# Patient Record
Sex: MALE | Race: WHITE | NOT HISPANIC OR LATINO | Employment: FULL TIME | URBAN - METROPOLITAN AREA
[De-identification: names, ages, dates, MRNs, and addresses within clinical notes are randomized per-mention and may not be internally consistent; named-entity substitution may affect disease eponyms.]

---

## 2024-02-16 NOTE — PROGRESS NOTES
Initial Medical Weight Loss Appointment (MWL 1)     Starting Weight: 284 lb / BMI: 40.17    Diet Experience: Jam reports that he hasn't tried any specific diets for weight loss attempts in the past, however he has tried to watch what he ate and choose healthier food options. Jam reports that he struggles the most with his hunger - he reports he does not ever feel full/satisfied. Jam reports that he experiences daily heartburn for the past year. He reports that every single food/beverage gives him heartburn besides water. Jam reports that he does take daily medication to help symptoms.   Pt Seeking: RYGB  Pertinent Co-morbidities: sleep apnea x 2 years. Pt reports he does not use a CPAP.   Current Daily Intake:    Breakfast- eggs with hash browns and murphy or sausage   Lunch- skips. OR snacks   Dinner- pasta dish, OR a meat such as fish/a roast with a vegetable   How many times do you order takeout, fast food and/or go out to eat per week? Maybe 1x/week per pt   Snacks: slim wil, Little Birgit   Beverages: 1 gallon per day of homemade sweet tea (tea mixed with sugar), apple juice, minimal water  Alcohol Intake: about every 2 weeks, 1-2 drinks per occasion. Mostly Twisted Tea's (1 can provides 190 kcal, 24g suagr)  Food Allergies? None  Food Intolerances? Basically all foods/seasonings/sauces/beverages cause acid reflux  Food Dislikes? Asapargus   Do you eat when you are not hungry and/or when you feel full? No   Do you eat when you are bored/stressed/emotional? No   Current Exercise/Exercise Hx: active in daily routine. Exercise hx includes treadmill/weights. Pt reports he did not feel like the exercise worked.   Hours of sleep per night: 4. Pt notes he struggles with sleep due to untreated sleep apnea and acid reflux during the night.   Work schedule: M-F, 8 hours per day.   Who is in the household? Himself, his fiance and 3 children  Who does the cooking/grocery shopping? His fiance does both   What do you  want to get out of surgery? Pt wants to be more active and fit into smaller clothing size.  Motivation to change (1-10): 9    Estimated ability to achieve goals: good    Today's Lesson: Review of Dr. Lara's lifelong rules, calorie counting, food label reading, promoting feelings of satiety, and energy balance.  Diet Goals: Practice the lifelong rules  Exercise Recommendations: Gradually work up to 150 minutes of moderate intensity exercise per week.  Behavior Changes: will be assessed every month  Behavior Goals:  1. Consistently incorporate a lunch meal.  2. Reduce intake of sweet tea. Try making tea with sugar replacement.   3. Exercise: walk 2x/week around block.     Plan: Will follow up next month. Will continue to monitor pt's weight, dietary & behavior changes.        Irena Bacon RD, LDN  Registered Dietitian, Licensed Dietitian Nutritionist

## 2024-02-21 ENCOUNTER — OFFICE VISIT (OUTPATIENT)
Dept: SURGERY | Facility: CLINIC | Age: 29
End: 2024-02-21
Payer: COMMERCIAL

## 2024-02-21 ENCOUNTER — NUTRITION (OUTPATIENT)
Dept: SURGERY | Facility: CLINIC | Age: 29
End: 2024-02-21
Payer: COMMERCIAL

## 2024-02-21 VITALS
BODY MASS INDEX: 39.76 KG/M2 | DIASTOLIC BLOOD PRESSURE: 80 MMHG | HEIGHT: 71 IN | RESPIRATION RATE: 16 BRPM | SYSTOLIC BLOOD PRESSURE: 133 MMHG | WEIGHT: 284 LBS

## 2024-02-21 VITALS — BODY MASS INDEX: 39.61 KG/M2 | WEIGHT: 284 LBS

## 2024-02-21 DIAGNOSIS — E63.9 NUTRITIONAL DISORDER: ICD-10-CM

## 2024-02-21 DIAGNOSIS — E61.0 COPPER DEFICIENCY: ICD-10-CM

## 2024-02-21 DIAGNOSIS — E53.8 B12 DEFICIENCY: ICD-10-CM

## 2024-02-21 DIAGNOSIS — E51.9 THIAMINE DEFICIENCY: ICD-10-CM

## 2024-02-21 DIAGNOSIS — K21.9 GERD WITHOUT ESOPHAGITIS: Primary | ICD-10-CM

## 2024-02-21 DIAGNOSIS — R73.9 HYPERGLYCEMIA: ICD-10-CM

## 2024-02-21 DIAGNOSIS — D50.8 IRON DEFICIENCY ANEMIA SECONDARY TO INADEQUATE DIETARY IRON INTAKE: ICD-10-CM

## 2024-02-21 DIAGNOSIS — G47.33 OBSTRUCTIVE SLEEP APNEA: ICD-10-CM

## 2024-02-21 DIAGNOSIS — E55.9 VITAMIN D DEFICIENCY: ICD-10-CM

## 2024-02-21 DIAGNOSIS — E07.9 DISEASE OF THYROID GLAND: ICD-10-CM

## 2024-02-21 DIAGNOSIS — D68.9 COAGULATION DISORDER (MULTI): ICD-10-CM

## 2024-02-21 DIAGNOSIS — Z71.3 ENCOUNTER FOR NUTRITION EVALUATION PRIOR TO BARIATRIC SURGERY: ICD-10-CM

## 2024-02-21 PROCEDURE — 1036F TOBACCO NON-USER: CPT | Performed by: INTERNAL MEDICINE

## 2024-02-21 PROCEDURE — 99204 OFFICE O/P NEW MOD 45 MIN: CPT | Performed by: INTERNAL MEDICINE

## 2024-02-21 ASSESSMENT — ENCOUNTER SYMPTOMS
ROS GI COMMENTS: HEARTBURN
COUGH: 0
ABDOMINAL PAIN: 0
DEPRESSION: 0
PALPITATIONS: 0
OCCASIONAL FEELINGS OF UNSTEADINESS: 0
SHORTNESS OF BREATH: 0
LOSS OF SENSATION IN FEET: 0
ARTHRALGIAS: 0
DIZZINESS: 0
NAUSEA: 0
DIARRHEA: 0
CONSTIPATION: 0

## 2024-02-21 ASSESSMENT — PATIENT HEALTH QUESTIONNAIRE - PHQ9
SUM OF ALL RESPONSES TO PHQ9 QUESTIONS 1 AND 2: 0
2. FEELING DOWN, DEPRESSED OR HOPELESS: NOT AT ALL
1. LITTLE INTEREST OR PLEASURE IN DOING THINGS: NOT AT ALL

## 2024-02-21 ASSESSMENT — PAIN SCALES - GENERAL: PAINLEVEL: 0-NO PAIN

## 2024-02-21 NOTE — PROGRESS NOTES
"Subjective   Patient ID: Jam Ann is a 28 y.o. male who presents for [facility] visit 1. Patient has been trying to lose weight for many years by following different diets like Weight Watchers, lost some weight but did not maintain. Currently has 2 meals a day. Breakfast includes eggs, murphy, sausage. He snacks for lunch. For dinner, chicken, steak, or pasta. Snacks on chips, cookies. Drinks sweet tea, a little water. Regarding exercise, he walks at work. Plans to increase. Denies previous sleep study. C/o heartburn. Denies personal hx of blood clots. His father has hx of DVT.    Diagnostics Reviewed: 7/2023 EKG NSR  Labs Reviewed:         Review of Systems   Respiratory:  Negative for cough and shortness of breath.    Cardiovascular:  Negative for chest pain and palpitations.   Gastrointestinal:  Negative for abdominal pain, constipation, diarrhea and nausea.        Heartburn   Musculoskeletal:  Negative for arthralgias.   Neurological:  Negative for dizziness.       Objective   /80   Resp 16   Ht 1.791 m (5' 10.5\")   Wt 129 kg (284 lb)   BMI 40.17 kg/m²     Physical Exam  Constitutional:       General: He is not in acute distress.     Appearance: He is obese.   HENT:      Mouth/Throat:      Comments: Dentition WNL  Cardiovascular:      Rate and Rhythm: Normal rate and regular rhythm.      Heart sounds: Normal heart sounds.   Pulmonary:      Breath sounds: Normal breath sounds.   Abdominal:      Palpations: Abdomen is soft.      Tenderness: There is no abdominal tenderness.   Musculoskeletal:      Cervical back: No tenderness.      Right lower leg: No edema.      Left lower leg: No edema.   Lymphadenopathy:      Cervical: No cervical adenopathy.   Skin:     General: Skin is warm.      Findings: No erythema.   Neurological:      Mental Status: He is alert and oriented to person, place, and time.      Gait: Gait is intact.   Psychiatric:         Mood and Affect: Mood normal.         Behavior: Behavior normal. "         Assessment/Plan   Diagnoses and all orders for this visit:  Coagulation disorder (CMS/HCC)  -     Home sleep apnea test (HSAT); Future  -     CBC and Auto Differential; Future  -     aPTT; Future  -     Hemoglobin A1C; Future  -     Folate; Future  -     Ferritin; Future  -     Comprehensive Metabolic Panel; Future  -     TSH with reflex to Free T4 if abnormal; Future  -     Lipid Panel; Future  -     Protime-INR; Future  -     Vitamin D 25-Hydroxy,Total (for eval of Vitamin D levels); Future  -     Vitamin B12; Future  -     Parathyroid Hormone, Intact; Future  -     Vitamin B1, Whole Blood; Future  -     Copper, Blood; Future  -     Vitamin A; Future  -     Vitamin E; Future  -     Zinc, Serum or Plasma; Future  -     Iron and TIBC; Future  Copper deficiency  -     Home sleep apnea test (HSAT); Future  -     CBC and Auto Differential; Future  -     aPTT; Future  -     Hemoglobin A1C; Future  -     Folate; Future  -     Ferritin; Future  -     Comprehensive Metabolic Panel; Future  -     TSH with reflex to Free T4 if abnormal; Future  -     Lipid Panel; Future  -     Protime-INR; Future  -     Vitamin D 25-Hydroxy,Total (for eval of Vitamin D levels); Future  -     Vitamin B12; Future  -     Parathyroid Hormone, Intact; Future  -     Vitamin B1, Whole Blood; Future  -     Copper, Blood; Future  -     Vitamin A; Future  -     Vitamin E; Future  -     Zinc, Serum or Plasma; Future  -     Iron and TIBC; Future  Encounter for nutrition evaluation prior to bariatric surgery  -     Home sleep apnea test (HSAT); Future  -     CBC and Auto Differential; Future  -     aPTT; Future  -     Hemoglobin A1C; Future  -     Folate; Future  -     Ferritin; Future  -     Comprehensive Metabolic Panel; Future  -     TSH with reflex to Free T4 if abnormal; Future  -     Lipid Panel; Future  -     Protime-INR; Future  -     Vitamin D 25-Hydroxy,Total (for eval of Vitamin D levels); Future  -     Vitamin B12; Future  -      Parathyroid Hormone, Intact; Future  -     Vitamin B1, Whole Blood; Future  -     Copper, Blood; Future  -     Vitamin A; Future  -     Vitamin E; Future  -     Zinc, Serum or Plasma; Future  -     Iron and TIBC; Future  Hyperglycemia  -     Home sleep apnea test (HSAT); Future  -     CBC and Auto Differential; Future  -     aPTT; Future  -     Hemoglobin A1C; Future  -     Folate; Future  -     Ferritin; Future  -     Comprehensive Metabolic Panel; Future  -     TSH with reflex to Free T4 if abnormal; Future  -     Lipid Panel; Future  -     Protime-INR; Future  -     Vitamin D 25-Hydroxy,Total (for eval of Vitamin D levels); Future  -     Vitamin B12; Future  -     Parathyroid Hormone, Intact; Future  -     Vitamin B1, Whole Blood; Future  -     Copper, Blood; Future  -     Vitamin A; Future  -     Vitamin E; Future  -     Zinc, Serum or Plasma; Future  -     Iron and TIBC; Future  B12 deficiency  -     Home sleep apnea test (HSAT); Future  -     CBC and Auto Differential; Future  -     aPTT; Future  -     Hemoglobin A1C; Future  -     Folate; Future  -     Ferritin; Future  -     Comprehensive Metabolic Panel; Future  -     TSH with reflex to Free T4 if abnormal; Future  -     Lipid Panel; Future  -     Protime-INR; Future  -     Vitamin D 25-Hydroxy,Total (for eval of Vitamin D levels); Future  -     Vitamin B12; Future  -     Parathyroid Hormone, Intact; Future  -     Vitamin B1, Whole Blood; Future  -     Copper, Blood; Future  -     Vitamin A; Future  -     Vitamin E; Future  -     Zinc, Serum or Plasma; Future  -     Iron and TIBC; Future  Disease of thyroid gland  -     Home sleep apnea test (HSAT); Future  -     CBC and Auto Differential; Future  -     aPTT; Future  -     Hemoglobin A1C; Future  -     Folate; Future  -     Ferritin; Future  -     Comprehensive Metabolic Panel; Future  -     TSH with reflex to Free T4 if abnormal; Future  -     Lipid Panel; Future  -     Protime-INR; Future  -     Vitamin D  25-Hydroxy,Total (for eval of Vitamin D levels); Future  -     Vitamin B12; Future  -     Parathyroid Hormone, Intact; Future  -     Vitamin B1, Whole Blood; Future  -     Copper, Blood; Future  -     Vitamin A; Future  -     Vitamin E; Future  -     Zinc, Serum or Plasma; Future  -     Iron and TIBC; Future  Iron deficiency anemia secondary to inadequate dietary iron intake  -     Home sleep apnea test (HSAT); Future  -     CBC and Auto Differential; Future  -     aPTT; Future  -     Hemoglobin A1C; Future  -     Folate; Future  -     Ferritin; Future  -     Comprehensive Metabolic Panel; Future  -     TSH with reflex to Free T4 if abnormal; Future  -     Lipid Panel; Future  -     Protime-INR; Future  -     Vitamin D 25-Hydroxy,Total (for eval of Vitamin D levels); Future  -     Vitamin B12; Future  -     Parathyroid Hormone, Intact; Future  -     Vitamin B1, Whole Blood; Future  -     Copper, Blood; Future  -     Vitamin A; Future  -     Vitamin E; Future  -     Zinc, Serum or Plasma; Future  -     Iron and TIBC; Future  Nutritional disorder  -     Home sleep apnea test (HSAT); Future  -     CBC and Auto Differential; Future  -     aPTT; Future  -     Hemoglobin A1C; Future  -     Folate; Future  -     Ferritin; Future  -     Comprehensive Metabolic Panel; Future  -     TSH with reflex to Free T4 if abnormal; Future  -     Lipid Panel; Future  -     Protime-INR; Future  -     Vitamin D 25-Hydroxy,Total (for eval of Vitamin D levels); Future  -     Vitamin B12; Future  -     Parathyroid Hormone, Intact; Future  -     Vitamin B1, Whole Blood; Future  -     Copper, Blood; Future  -     Vitamin A; Future  -     Vitamin E; Future  -     Zinc, Serum or Plasma; Future  -     Iron and TIBC; Future  Obstructive sleep apnea  -     Home sleep apnea test (HSAT); Future  -     CBC and Auto Differential; Future  -     aPTT; Future  -     Hemoglobin A1C; Future  -     Folate; Future  -     Ferritin; Future  -     Comprehensive  Metabolic Panel; Future  -     TSH with reflex to Free T4 if abnormal; Future  -     Lipid Panel; Future  -     Protime-INR; Future  -     Vitamin D 25-Hydroxy,Total (for eval of Vitamin D levels); Future  -     Vitamin B12; Future  -     Parathyroid Hormone, Intact; Future  -     Vitamin B1, Whole Blood; Future  -     Copper, Blood; Future  -     Vitamin A; Future  -     Vitamin E; Future  -     Zinc, Serum or Plasma; Future  -     Iron and TIBC; Future  Thiamine deficiency  -     Home sleep apnea test (HSAT); Future  -     CBC and Auto Differential; Future  -     aPTT; Future  -     Hemoglobin A1C; Future  -     Folate; Future  -     Ferritin; Future  -     Comprehensive Metabolic Panel; Future  -     TSH with reflex to Free T4 if abnormal; Future  -     Lipid Panel; Future  -     Protime-INR; Future  -     Vitamin D 25-Hydroxy,Total (for eval of Vitamin D levels); Future  -     Vitamin B12; Future  -     Parathyroid Hormone, Intact; Future  -     Vitamin B1, Whole Blood; Future  -     Copper, Blood; Future  -     Vitamin A; Future  -     Vitamin E; Future  -     Zinc, Serum or Plasma; Future  -     Iron and TIBC; Future  Vitamin D deficiency  -     Home sleep apnea test (HSAT); Future  -     CBC and Auto Differential; Future  -     aPTT; Future  -     Hemoglobin A1C; Future  -     Folate; Future  -     Ferritin; Future  -     Comprehensive Metabolic Panel; Future  -     TSH with reflex to Free T4 if abnormal; Future  -     Lipid Panel; Future  -     Protime-INR; Future  -     Vitamin D 25-Hydroxy,Total (for eval of Vitamin D levels); Future  -     Vitamin B12; Future  -     Parathyroid Hormone, Intact; Future  -     Vitamin B1, Whole Blood; Future  -     Copper, Blood; Future  -     Vitamin A; Future  -     Vitamin E; Future  -     Zinc, Serum or Plasma; Future  -     Iron and TIBC; Future  GERD without esophagitis  -     H. Pylori Antigen, Stool; Future      Scribe Attestation  By signing my name below, I,  Sherry Kaye   attest that this documentation has been prepared under the direction and in the presence of Becky Tabor MD.

## 2024-02-27 ENCOUNTER — TELEPHONE (OUTPATIENT)
Dept: SURGERY | Facility: CLINIC | Age: 29
End: 2024-02-27
Payer: COMMERCIAL

## 2024-02-27 NOTE — TELEPHONE ENCOUNTER
Spoke with patient, would like to have prescription for omeprazole 40mg instead of buying over the counter. Can you send a prescription to [facility] in [place] for him? Thanks.

## 2024-03-08 NOTE — PROGRESS NOTES
Medical Weight Loss Appointment (MWL 2)    Current Weight: 280 lb / This reflects a  4lb wt loss x 3 weeks.   Current BMI: 39.61    Current Diet: partially following the lifelong rules   Adherence: good  Daily Intake:   Breakfast- 2 scrambled eggs, turkey murphy and 1/2 slice of wheat bread, OR a tuna packet   Lunch- a protein shake, OR just strawberries, OR a salad with cucumbers, croutons and italian dressing   Dinner-  a protein (fish, shrimp, crab legs, 6-9 oz of steak) paired with a vegetable such as broccoli and 1/4 of a [employer] rice bag.   Pt reports eating out 4x this past month.   Snacks: none   Beverages: water with lemon, 2 glasses of sweet tea last week, Cirkul flavored water   Exercise: active in daily routine + more walking this past month while on vacation.   Behavior/Diet Changes: Jam significantly reduced his sweet tea intake and replaced it with lemon water/Cirkul flavored water. Jam also has incorporated more of a lunch meal this past month.     Estimated ability to achieve goals: good.     Today's Lesson: Reviewed patient's dietary changes and food recall. Reviewed the lifelong rules and importance of protein. Provided suggestions for ways to increase protein at lunch meal.   Diet Goals: Practice the lifelong rules  Exercise Recommendations: Gradually work up to 150 minutes of moderate intensity exercise per week.  Behavior Goals:   1. Increase protein intake at lunch meal.   2. Continue to incorporate 3 meals per day.   3. Continue to reduce sweet tea intake.     Plan: Will follow up next month. Will continue to monitor pt's weight, dietary & behavior changes.       Irena Bacon RD, LDN  Registered Dietitian, Licensed Dietitian Nutritionist

## 2024-03-12 ENCOUNTER — NUTRITION (OUTPATIENT)
Dept: SURGERY | Facility: CLINIC | Age: 29
End: 2024-03-12
Payer: COMMERCIAL

## 2024-03-12 ENCOUNTER — OFFICE VISIT (OUTPATIENT)
Dept: SURGERY | Facility: CLINIC | Age: 29
End: 2024-03-12
Payer: COMMERCIAL

## 2024-03-12 VITALS
HEART RATE: 78 BPM | HEIGHT: 71 IN | RESPIRATION RATE: 16 BRPM | WEIGHT: 280 LBS | SYSTOLIC BLOOD PRESSURE: 132 MMHG | BODY MASS INDEX: 39.2 KG/M2 | DIASTOLIC BLOOD PRESSURE: 88 MMHG

## 2024-03-12 VITALS — BODY MASS INDEX: 39.61 KG/M2 | WEIGHT: 280 LBS

## 2024-03-12 DIAGNOSIS — K21.9 GERD WITHOUT ESOPHAGITIS: Primary | ICD-10-CM

## 2024-03-12 DIAGNOSIS — E66.01 MORBID OBESITY DUE TO EXCESS CALORIES (MULTI): ICD-10-CM

## 2024-03-12 PROCEDURE — 99213 OFFICE O/P EST LOW 20 MIN: CPT | Performed by: INTERNAL MEDICINE

## 2024-03-12 PROCEDURE — 1036F TOBACCO NON-USER: CPT | Performed by: INTERNAL MEDICINE

## 2024-03-12 RX ORDER — OMEPRAZOLE 20 MG/1
20 CAPSULE, DELAYED RELEASE ORAL
COMMUNITY
End: 2024-04-11 | Stop reason: SDUPTHER

## 2024-03-12 ASSESSMENT — ENCOUNTER SYMPTOMS
OCCASIONAL FEELINGS OF UNSTEADINESS: 0
COUGH: 0
ROS GI COMMENTS: HEARTBURN
DIZZINESS: 0
ARTHRALGIAS: 0
CONSTIPATION: 0
ABDOMINAL PAIN: 0
SHORTNESS OF BREATH: 0
LOSS OF SENSATION IN FEET: 0
DEPRESSION: 0
DIARRHEA: 0
NAUSEA: 0
PALPITATIONS: 0

## 2024-03-12 ASSESSMENT — PATIENT HEALTH QUESTIONNAIRE - PHQ9
2. FEELING DOWN, DEPRESSED OR HOPELESS: NOT AT ALL
SUM OF ALL RESPONSES TO PHQ9 QUESTIONS 1 AND 2: 0
1. LITTLE INTEREST OR PLEASURE IN DOING THINGS: NOT AT ALL

## 2024-03-12 ASSESSMENT — PAIN SCALES - GENERAL: PAINLEVEL: 0-NO PAIN

## 2024-03-12 NOTE — PROGRESS NOTES
"Subjective   Patient ID: Jam Ann is a 29 y.o. male who presents for [facility] visit 2. Currently has 3 meals a day, protein with each meal. Has protein shake and salads with lunch. Eats fish for dinner. He cut down snacking. Drinks a lot of water with lemon. Regarding exercise, he walks daily at work. C/o heartburn. He has not heard from sleep lab.    Diagnostics Reviewed: 7/2023 EKG NSR  Labs Reviewed: Will get labs         Review of Systems   Respiratory:  Negative for cough and shortness of breath.    Cardiovascular:  Negative for chest pain and palpitations.   Gastrointestinal:  Negative for abdominal pain, constipation, diarrhea and nausea.        Heartburn   Musculoskeletal:  Negative for arthralgias.   Neurological:  Negative for dizziness.       Objective   /88   Pulse 78   Resp 16   Ht 1.791 m (5' 10.5\")   Wt 127 kg (280 lb)   BMI 39.61 kg/m²     Physical Exam  Constitutional:       Appearance: He is obese.   HENT:      Mouth/Throat:      Comments: Dentition ok  Cardiovascular:      Rate and Rhythm: Normal rate and regular rhythm.   Pulmonary:      Breath sounds: Normal breath sounds.   Abdominal:      General: Bowel sounds are normal.      Palpations: Abdomen is soft.   Musculoskeletal:         General: No swelling.   Neurological:      Mental Status: He is alert.         Assessment/Plan   Diagnoses and all orders for this visit:  GERD without esophagitis  Morbid obesity due to excess calories (CMS/HCC)      Scribe Attestation  By signing my name below, Andree SOLIZ, [employer]   attest that this documentation has been prepared under the direction and in the presence of Becky Tabor MD.  "

## 2024-04-03 NOTE — PROGRESS NOTES
Medical Weight Loss Appointment (MWL 3)    Current Weight: 280 lb / Weight is stable x 1 month.   Current BMI: 39.61    Current Diet: practicing the lifelong rules   Adherence: good  Daily Intake: usually 1 protein shake + 2 meals per day.   Breakfast- 2 hard boiled eggs, OR a [employer]life protein shake, OR egg bites from [employer] yesterday  Lunch- a Pure Protein bar and celery and ranch to dip, OR a Fairlife protein shake   Dinner- a lot of chicken with a vegetable, OR ground turkey pasta, OR slices of deli turkey, OR a Fairlife protein shake   Snacks: fruit   Beverages: water with lime, Cirkul water, water infused with dried strawberries, Fairlife protein shake   Exercise: active during work/day  Behavior/Diet Changes: Kirby reports he cut back on snacking this past month and is continuing to follow the lifelong rules.     Estimated ability to achieve goals: good.     Today's Lesson: Reviewed patient's dietary changes and food recall. Recommended that Kirby increase exercise to help with further weight loss. Recommended that kirby use fresh strawberries vs the dry version in water due to sugars. Suggested that Kirby try [employer] ranch dressing, or use the light version.   Diet Goals: Practice the lifelong rules  Exercise Recommendations: Gradually work up to 150 minutes of moderate intensity exercise per week.  Behavior Goals:   1. Increase exercise   2. Continue to follow the lifelong rules     Plan: Will follow up next month. Will continue to monitor pt's weight, dietary & behavior changes. Kirby notes he has his psych evaluation scheduled for this month with Dr. Ambriz.         Irena Bacon, RD, LDN  Registered Dietitian, Licensed Dietitian Nutritionist

## 2024-04-11 ENCOUNTER — LAB (OUTPATIENT)
Dept: LAB | Facility: LAB | Age: 29
End: 2024-04-11
Payer: COMMERCIAL

## 2024-04-11 ENCOUNTER — TELEMEDICINE (OUTPATIENT)
Dept: SURGERY | Facility: CLINIC | Age: 29
End: 2024-04-11
Payer: COMMERCIAL

## 2024-04-11 VITALS — BODY MASS INDEX: 41.16 KG/M2 | WEIGHT: 294 LBS | HEIGHT: 71 IN

## 2024-04-11 DIAGNOSIS — E53.8 B12 DEFICIENCY: ICD-10-CM

## 2024-04-11 DIAGNOSIS — E78.1 HYPERTRIGLYCERIDEMIA: ICD-10-CM

## 2024-04-11 DIAGNOSIS — K21.9 GERD WITHOUT ESOPHAGITIS: ICD-10-CM

## 2024-04-11 DIAGNOSIS — K21.9 GERD WITHOUT ESOPHAGITIS: Primary | ICD-10-CM

## 2024-04-11 DIAGNOSIS — E55.9 VITAMIN D DEFICIENCY: ICD-10-CM

## 2024-04-11 DIAGNOSIS — E07.9 DISEASE OF THYROID GLAND: ICD-10-CM

## 2024-04-11 DIAGNOSIS — E61.0 COPPER DEFICIENCY: ICD-10-CM

## 2024-04-11 DIAGNOSIS — D68.9 COAGULATION DISORDER (MULTI): ICD-10-CM

## 2024-04-11 DIAGNOSIS — E51.9 THIAMINE DEFICIENCY: ICD-10-CM

## 2024-04-11 DIAGNOSIS — Z71.3 ENCOUNTER FOR NUTRITION EVALUATION PRIOR TO BARIATRIC SURGERY: ICD-10-CM

## 2024-04-11 DIAGNOSIS — E66.01 MORBID OBESITY DUE TO EXCESS CALORIES (MULTI): ICD-10-CM

## 2024-04-11 DIAGNOSIS — E63.9 NUTRITIONAL DISORDER: ICD-10-CM

## 2024-04-11 DIAGNOSIS — D50.8 IRON DEFICIENCY ANEMIA SECONDARY TO INADEQUATE DIETARY IRON INTAKE: ICD-10-CM

## 2024-04-11 DIAGNOSIS — R73.9 HYPERGLYCEMIA: ICD-10-CM

## 2024-04-11 DIAGNOSIS — G47.33 OBSTRUCTIVE SLEEP APNEA: ICD-10-CM

## 2024-04-11 LAB
25(OH)D3 SERPL-MCNC: 14 NG/ML (ref 30–100)
ALBUMIN SERPL BCP-MCNC: 4.7 G/DL (ref 3.4–5)
ALP SERPL-CCNC: 64 U/L (ref 33–120)
ALT SERPL W P-5'-P-CCNC: 51 U/L (ref 10–52)
ANION GAP SERPL CALC-SCNC: 13 MMOL/L (ref 10–20)
APTT PPP: 34 SECONDS (ref 27–38)
AST SERPL W P-5'-P-CCNC: 27 U/L (ref 9–39)
BASOPHILS # BLD AUTO: 0.04 X10*3/UL (ref 0–0.1)
BASOPHILS NFR BLD AUTO: 0.7 %
BILIRUB SERPL-MCNC: 0.4 MG/DL (ref 0–1.2)
BUN SERPL-MCNC: 14 MG/DL (ref 6–23)
CALCIUM SERPL-MCNC: 9.4 MG/DL (ref 8.6–10.3)
CHLORIDE SERPL-SCNC: 104 MMOL/L (ref 98–107)
CHOLEST SERPL-MCNC: 162 MG/DL (ref 0–199)
CHOLESTEROL/HDL RATIO: 5
CO2 SERPL-SCNC: 25 MMOL/L (ref 21–32)
CREAT SERPL-MCNC: 0.99 MG/DL (ref 0.5–1.3)
EGFRCR SERPLBLD CKD-EPI 2021: >90 ML/MIN/1.73M*2
EOSINOPHIL # BLD AUTO: 0.25 X10*3/UL (ref 0–0.7)
EOSINOPHIL NFR BLD AUTO: 4.6 %
ERYTHROCYTE [DISTWIDTH] IN BLOOD BY AUTOMATED COUNT: 12.3 % (ref 11.5–14.5)
EST. AVERAGE GLUCOSE BLD GHB EST-MCNC: 97 MG/DL
FERRITIN SERPL-MCNC: 251 NG/ML (ref 20–300)
FOLATE SERPL-MCNC: 9 NG/ML
GLUCOSE SERPL-MCNC: 147 MG/DL (ref 74–99)
HBA1C MFR BLD: 5 %
HCT VFR BLD AUTO: 46.5 % (ref 41–52)
HDLC SERPL-MCNC: 32.6 MG/DL
HGB BLD-MCNC: 14.9 G/DL (ref 13.5–17.5)
IMM GRANULOCYTES # BLD AUTO: 0.01 X10*3/UL (ref 0–0.7)
IMM GRANULOCYTES NFR BLD AUTO: 0.2 % (ref 0–0.9)
INR PPP: 1 (ref 0.9–1.1)
IRON SATN MFR SERPL: 27 % (ref 25–45)
IRON SERPL-MCNC: 97 UG/DL (ref 35–150)
LDLC SERPL CALC-MCNC: 67 MG/DL
LYMPHOCYTES # BLD AUTO: 2.13 X10*3/UL (ref 1.2–4.8)
LYMPHOCYTES NFR BLD AUTO: 39.5 %
MCH RBC QN AUTO: 27.7 PG (ref 26–34)
MCHC RBC AUTO-ENTMCNC: 32 G/DL (ref 32–36)
MCV RBC AUTO: 87 FL (ref 80–100)
MONOCYTES # BLD AUTO: 0.29 X10*3/UL (ref 0.1–1)
MONOCYTES NFR BLD AUTO: 5.4 %
NEUTROPHILS # BLD AUTO: 2.67 X10*3/UL (ref 1.2–7.7)
NEUTROPHILS NFR BLD AUTO: 49.6 %
NON HDL CHOLESTEROL: 129 MG/DL (ref 0–149)
NRBC BLD-RTO: 0 /100 WBCS (ref 0–0)
PLATELET # BLD AUTO: 260 X10*3/UL (ref 150–450)
POTASSIUM SERPL-SCNC: 3.7 MMOL/L (ref 3.5–5.3)
PROT SERPL-MCNC: 7.4 G/DL (ref 6.4–8.2)
PROTHROMBIN TIME: 10.7 SECONDS (ref 9.8–12.8)
PTH-INTACT SERPL-MCNC: 60 PG/ML (ref 18.5–88)
RBC # BLD AUTO: 5.37 X10*6/UL (ref 4.5–5.9)
SODIUM SERPL-SCNC: 138 MMOL/L (ref 136–145)
TIBC SERPL-MCNC: 354 UG/DL (ref 240–445)
TRIGL SERPL-MCNC: 312 MG/DL (ref 0–149)
TSH SERPL-ACNC: 1.97 MIU/L (ref 0.44–3.98)
UIBC SERPL-MCNC: 257 UG/DL (ref 110–370)
VIT B12 SERPL-MCNC: 616 PG/ML (ref 211–911)
VLDL: 62 MG/DL (ref 0–40)
WBC # BLD AUTO: 5.4 X10*3/UL (ref 4.4–11.3)

## 2024-04-11 PROCEDURE — 84590 ASSAY OF VITAMIN A: CPT

## 2024-04-11 PROCEDURE — 99213 OFFICE O/P EST LOW 20 MIN: CPT | Performed by: INTERNAL MEDICINE

## 2024-04-11 PROCEDURE — 82306 VITAMIN D 25 HYDROXY: CPT

## 2024-04-11 PROCEDURE — 84425 ASSAY OF VITAMIN B-1: CPT

## 2024-04-11 PROCEDURE — 84630 ASSAY OF ZINC: CPT

## 2024-04-11 PROCEDURE — 36415 COLL VENOUS BLD VENIPUNCTURE: CPT

## 2024-04-11 PROCEDURE — 84446 ASSAY OF VITAMIN E: CPT

## 2024-04-11 PROCEDURE — 83036 HEMOGLOBIN GLYCOSYLATED A1C: CPT

## 2024-04-11 PROCEDURE — 82525 ASSAY OF COPPER: CPT

## 2024-04-11 PROCEDURE — 83970 ASSAY OF PARATHORMONE: CPT

## 2024-04-11 PROCEDURE — 82607 VITAMIN B-12: CPT

## 2024-04-11 PROCEDURE — 82746 ASSAY OF FOLIC ACID SERUM: CPT

## 2024-04-11 RX ORDER — CHOLECALCIFEROL (VITAMIN D3) 1250 MCG
50000 TABLET ORAL
Qty: 12 TABLET | Refills: 3 | Status: SHIPPED | OUTPATIENT
Start: 2024-04-11 | End: 2025-04-11

## 2024-04-11 RX ORDER — OMEPRAZOLE 40 MG/1
40 CAPSULE, DELAYED RELEASE ORAL
Qty: 30 CAPSULE | Refills: 6 | Status: SHIPPED | OUTPATIENT
Start: 2024-04-11

## 2024-04-11 ASSESSMENT — ENCOUNTER SYMPTOMS
NAUSEA: 0
LOSS OF SENSATION IN FEET: 0
ROS GI COMMENTS: HEARTBURN
SHORTNESS OF BREATH: 0
DIARRHEA: 0
DEPRESSION: 0
ARTHRALGIAS: 0
COUGH: 0
DIZZINESS: 0
PALPITATIONS: 0
ABDOMINAL PAIN: 0
CONSTIPATION: 0
OCCASIONAL FEELINGS OF UNSTEADINESS: 0

## 2024-04-11 ASSESSMENT — PATIENT HEALTH QUESTIONNAIRE - PHQ9
1. LITTLE INTEREST OR PLEASURE IN DOING THINGS: NOT AT ALL
SUM OF ALL RESPONSES TO PHQ9 QUESTIONS 1 AND 2: 0
2. FEELING DOWN, DEPRESSED OR HOPELESS: NOT AT ALL

## 2024-04-11 ASSESSMENT — PAIN SCALES - GENERAL: PAINLEVEL: 0-NO PAIN

## 2024-04-11 NOTE — PROGRESS NOTES
"Subjective   Patient ID: Jam Ann is a 29 y.o. male who presents virtually for [facility] visit 3. Currently has 3 meals a day, 20g protein with each meal. Does not snack. Drinks water, vitamin water, or gatorade. Has been eating more fruits and grilled chicken. Does not count daily calories. Regarding exercise, he is active at work and walks 2-3 miles every other day. Patient still c/o heartburn after trying omeprazole. Still has not heard from sleep lab.    Diagnostics Reviewed: 7/2023 EKG NSR  Labs Reviewed:          Review of Systems   Respiratory:  Negative for cough and shortness of breath.    Cardiovascular:  Negative for chest pain and palpitations.   Gastrointestinal:  Negative for abdominal pain, constipation, diarrhea and nausea.        Heartburn   Musculoskeletal:  Negative for arthralgias.   Neurological:  Negative for dizziness.       Objective   Ht 1.791 m (5' 10.5\")   Wt 133 kg (294 lb)   BMI 41.59 kg/m²     Physical Exam  Neurological:      Mental Status: He is alert and oriented to person, place, and time.   Psychiatric:         Mood and Affect: Mood normal.         Behavior: Behavior normal.         Assessment/Plan   Diagnoses and all orders for this visit:  GERD without esophagitis  -     omeprazole (PriLOSEC) 40 mg DR capsule; Take 1 capsule (40 mg) by mouth once daily in the morning. Take before meals. Do not crush or chew.        Scribe Attestation  By signing my name below, IAndree, [employer]   attest that this documentation has been prepared under the direction and in the presence of Becky Tabor MD.  "

## 2024-04-12 ENCOUNTER — OFFICE VISIT (OUTPATIENT)
Dept: SURGERY | Facility: CLINIC | Age: 29
End: 2024-04-12
Payer: COMMERCIAL

## 2024-04-12 ENCOUNTER — NUTRITION (OUTPATIENT)
Dept: SURGERY | Facility: CLINIC | Age: 29
End: 2024-04-12
Payer: COMMERCIAL

## 2024-04-12 VITALS
WEIGHT: 280 LBS | DIASTOLIC BLOOD PRESSURE: 117 MMHG | SYSTOLIC BLOOD PRESSURE: 160 MMHG | BODY MASS INDEX: 39.2 KG/M2 | HEIGHT: 71 IN | HEART RATE: 86 BPM

## 2024-04-12 VITALS — BODY MASS INDEX: 39.61 KG/M2 | DIASTOLIC BLOOD PRESSURE: 117 MMHG | WEIGHT: 280 LBS | SYSTOLIC BLOOD PRESSURE: 160 MMHG

## 2024-04-12 DIAGNOSIS — R40.0 DAYTIME SOMNOLENCE: ICD-10-CM

## 2024-04-12 DIAGNOSIS — Z82.49 FAMILY HISTORY OF DVT: ICD-10-CM

## 2024-04-12 DIAGNOSIS — E66.01 MORBID OBESITY WITH BMI OF 40.0-44.9, ADULT (MULTI): Primary | ICD-10-CM

## 2024-04-12 DIAGNOSIS — K21.9 GASTROESOPHAGEAL REFLUX DISEASE WITHOUT ESOPHAGITIS: ICD-10-CM

## 2024-04-12 DIAGNOSIS — I10 PRIMARY HYPERTENSION: ICD-10-CM

## 2024-04-12 PROCEDURE — 3008F BODY MASS INDEX DOCD: CPT | Performed by: SURGERY

## 2024-04-12 PROCEDURE — 3080F DIAST BP >= 90 MM HG: CPT | Performed by: SURGERY

## 2024-04-12 PROCEDURE — 99204 OFFICE O/P NEW MOD 45 MIN: CPT | Performed by: SURGERY

## 2024-04-12 PROCEDURE — 3077F SYST BP >= 140 MM HG: CPT | Performed by: SURGERY

## 2024-04-12 RX ORDER — OMEPRAZOLE 40 MG/1
40 CAPSULE, DELAYED RELEASE ORAL
Qty: 60 CAPSULE | Refills: 11 | Status: SHIPPED | OUTPATIENT
Start: 2024-04-12 | End: 2025-04-12

## 2024-04-12 ASSESSMENT — LIFESTYLE VARIABLES
HOW MANY STANDARD DRINKS CONTAINING ALCOHOL DO YOU HAVE ON A TYPICAL DAY: 1 OR 2
HOW OFTEN DO YOU HAVE A DRINK CONTAINING ALCOHOL: 2-3 TIMES A WEEK
HOW OFTEN DO YOU HAVE SIX OR MORE DRINKS ON ONE OCCASION: NEVER
AUDIT-C TOTAL SCORE: 3
SKIP TO QUESTIONS 9-10: 1

## 2024-04-12 ASSESSMENT — PAIN SCALES - GENERAL: PAINLEVEL: 0-NO PAIN

## 2024-04-12 ASSESSMENT — COLUMBIA-SUICIDE SEVERITY RATING SCALE - C-SSRS
1. IN THE PAST MONTH, HAVE YOU WISHED YOU WERE DEAD OR WISHED YOU COULD GO TO SLEEP AND NOT WAKE UP?: NO
2. HAVE YOU ACTUALLY HAD ANY THOUGHTS OF KILLING YOURSELF?: NO
6. HAVE YOU EVER DONE ANYTHING, STARTED TO DO ANYTHING, OR PREPARED TO DO ANYTHING TO END YOUR LIFE?: NO

## 2024-04-12 NOTE — PROGRESS NOTES
Subjective   Patient ID: Jam Ann is a 29 y.o. male who presents for No chief complaint on file..  HPI  BARIATRIC CONSULT  DOES NOT HAVE PCP  START WT:  284 IDEAL WT:   177  START EXCESS:   107  HT: 70.5 In  family history blood clots  YRS OF OBESITY 8 yrs  GREATEST WT LOSS IN LBS:40  PROGRAMS FOR WT LOSS IN THE PAST : behavior modification, dietitian visits  Review of Systems  Admits to body fatigue  Allergy/Immunologic:          HIV / AIDS No.  Hepatitis A No.  Hepatitis B No.  Hepatitis C No.  Immunosuppressent drugs No.         HEENT:          Headache negative.         CARDIOLOGY:          History of Hyperlipidemia yes Last stress test 04/11/2022.  Last echocardiogram 3 yrs ago  Chest pain No.  High blood pressure yes.  Irregular heart beat No.  Known coronary artery disease No.  Pacemaker No.  Palpitations No.         RESPIRATORY:          Hx steroid use yes.  ER visits or Hospitalizations for breathing problems yes.  Sleep Apnea snoring, daytime sleepiness, morning headaches.  BERMUDEZ (dyspnea on exertion) yes.  Hx of Asthma/COPD No.         GASTROENTEROLOGY:          Peptic ulcer No, Last EGD N/A, Last UGI N/A.  Colonoscopy  Last Colonoscopy N/A.  Heartburn yes.         ENDOCRINOLOGY:          Diabetes No.  Thyroid disorder No.         EXTREMITIES:          Varicose Veins yes  Stasis Ulcers No.  Ankle swelling yes.  Personal history DVT No.  Personal history PE No.  Personal history of other thrombolic events No.  Family history of VTE yes  Known genetic bleeding or clotting disorder No.         UROLOGY:          Kidney disease No.  Kidney stones No.  Previous UTIs No.  Urinary incontinence No.         MUSCULOSKELETAL:          Osteoporosis/Osteopenia No.  Arthritis No.  Joint pain yes.         SKIN:          Hidradenitis No.  Open skin wounds No.  Rosacea No.  Healing problems No.         PSYCHOLOGY:          Anxiety none.  Depression none.  Eating disorder denies.       Objective   Physical  Exam    Assessment/Plan            Minerva Stevens LPN 04/12/24 8:58 AM

## 2024-04-12 NOTE — H&P
History Of Present Illness  Jam Ann is a 29 y.o. man here for consultation for bariatric surgery. He suffers from morbid obesity and has been overweight for many years and has a number of weight related comorbidities. He has attempted to lose weight several times over the years. He has had successes but has regained the weight at the completion of each of his dieting attempts. He is being referred for surgical intervention for his clinically significant morbid obesity.  Jam has had two year history of progressive reflux symptoms.  He will feel as if he has subxiphoid burning both during the day and at night.  His nocturnal symptoms would wake him from sleep.  He would have postprandial symptoms regardless of what he would eat or drink.  He would take OTC ppi with minimal relief and would take tums multiple times per day.  He would have several hours of relief.  He has symptoms every other day on the omeprazole 40 mg daily that he was given by Dr. Tabor in February.  I recommended increasing ppi to bid for now and obtaining both an EGD and an UGI to better understand his symptoms prior to surgery.     -Bariatric Consultation:          START WT:  284 IDEAL WT:   177  START EXCESS:   107  HT: 70.5 In  YRS OF OBESITY 8 yrs  GREATEST WT LOSS IN LBS:40  PROGRAMS FOR WT LOSS IN THE PAST : behavior modification, dietitian visits.    Past Medical History  Past Medical History:   Diagnosis Date    GERD (gastroesophageal reflux disease)        Surgical History  No past surgical history on file.     Social History  He reports that he has never smoked. He has never been exposed to tobacco smoke. He has never used smokeless tobacco. He reports that he does not currently use alcohol. He reports that he does not use drugs.    Family History  Family History   Problem Relation Name Age of Onset    Asthma Mother      Diabetes Father      Heart disease Father          Allergies  Bee venom protein (honey bee) and  Azithromycin    Review of Systems   Allergy/Immunologic:          HIV / AIDS No.  Hepatitis A No.  Hepatitis B No.  Hepatitis C No.  Immunosuppressent drugs No.         HEENT:          Headache negative.         CARDIOLOGY:          History of Hyperlipidemia yes Last stress test 04/11/2022.  Last echocardiogram 3 yrs ago  Chest pain No.  High blood pressure yes.  Irregular heart beat No.  Known coronary artery disease No.  Pacemaker No.  Palpitations No.         RESPIRATORY:          Hx steroid use yes.  ER visits or Hospitalizations for breathing problems yes.  Sleep Apnea snoring, daytime sleepiness, morning headaches.  BERMUDEZ (dyspnea on exertion) yes.  Hx of Asthma/COPD No.         GASTROENTEROLOGY:          Peptic ulcer No, Last EGD N/A, Last UGI N/A.  Colonoscopy  Last Colonoscopy N/A.  Heartburn yes.         ENDOCRINOLOGY:          Diabetes No.  Thyroid disorder No.         EXTREMITIES:          Varicose Veins yes  Stasis Ulcers No.  Ankle swelling yes.  Personal history DVT No.  Personal history PE No.  Personal history of other thrombolic events No.  Family history of VTE yes  Known genetic bleeding or clotting disorder No.         UROLOGY:          Kidney disease No.  Kidney stones No.  Previous UTIs No.  Urinary incontinence No.         MUSCULOSKELETAL:          Osteoporosis/Osteopenia No.  Arthritis No.  Joint pain yes.         SKIN:          Hidradenitis No.  Open skin wounds No.  Rosacea No.  Healing problems No.         PSYCHOLOGY:          Anxiety none.  Depression none.  Eating disorder denies.             Physical Exam       General Examination:         GENERAL APPEARANCE: alert and oriented x 3, Pleasant and cooperative, No Acute Distress.          HEENT: PERRLA.          NECK: no lymphadenopathy, no thyromegaly, no JVD, normal flexion, normal extension.          HEART: regular rate and rhythm.          LUNGS: clear to auscultation bilaterally.          CHEST: normal shape and expansion.           "ABDOMEN: obese, no hernias present, soft and not tender, no guarding, no CVA tenderness.          EXTREMITIES: pulses 2 plus bilaterally, trace bilateral edema, no ulcerations.          SKIN: normal, no rash.          NEUROLOGIC EXAM: CN's II-XII grossly intact, gait normal.          BACK: no CVA tenderness.       Last Recorded Vitals  Blood pressure (!) 160/117, pulse 86, height 1.791 m (5' 10.5\"), weight 127 kg (280 lb).         Assessment/Plan   Problem List Items Addressed This Visit             ICD-10-CM    Gastroesophageal reflux disease without esophagitis K21.9    Relevant Medications    omeprazole (PriLOSEC) 40 mg DR capsule    Other Relevant Orders    FL upper GI w KUB    Request for Pre-Admission Testing Visit    Morbid obesity with BMI of 40.0-44.9, adult (Multi) - Primary E66.01, Z68.41    Daytime somnolence R40.0    Primary hypertension I10    Family history of DVT Z82.49       We spent 45 minutes reviewing the three surgical options available in the treatment of morbid obesity in our practice. We reviewed the laparoscopic approach to the Aakash-en-Y gastric bypass, the vertical sleeve gastrectomy, and the adjustable gastric band. We reviewed the surgical technique, the risks, and my complication rates. We also reviewed the expected weight loss, the rules necessary for mi-term success, the nutritional supplementation recommended for each operation, and the importance of incorporating excercise into the lifestyle to maintain the weight. We reviewed both the national history with each operation, my experience with each operation, the lack of long-term weight loss data with the vertical sleeve gastrectomy and the potential for exacerbating reflux with the vertical sleeve gastrectomy. In addition, we discussed the risk of adhesions, internal hernias, iron and calcium deficiency, dumping syndrome and potential for gastrojejunal ulcer with the RYGB. Jam would like to proceed with RYGB.  Based on symptoms, " we will obtain UGI and EGD to rule out pathology that would make bariatric surgery contraindicated.  Additionally, he has had elevated BP at multiple physician visits consistent with undiagnosed hypertension.  I recommended initiation of treatment.  Based on symptoms and BMI, Jam is at increased risk of having undiagnosed RANJITH.  We will obtain sleep study.  Jam's father had a pulmonary embolism, so we will discharge Jam on lovenox in attempt to reduce risk of perioperative VTE.         Saurabh Lara MD

## 2024-04-12 NOTE — RESULT ENCOUNTER NOTE
Labs okay except low vitamin D, needs to start supplement and take with meal to improve absorption, I sent prescription to his pharmacy.  Triglycerides are high, needs to follow low-fat diet

## 2024-04-13 LAB
COPPER SERPL-MCNC: 108.9 UG/DL (ref 70–140)
ZINC SERPL-MCNC: 81.8 UG/DL (ref 60–120)

## 2024-04-14 LAB
A-TOCOPHEROL VIT E SERPL-MCNC: 7.7 MG/L (ref 5.5–18)
ANNOTATION COMMENT IMP: NORMAL
BETA+GAMMA TOCOPHEROL SERPL-MCNC: 2.4 MG/L (ref 0–6)
RETINYL PALMITATE SERPL-MCNC: <0.02 MG/L (ref 0–0.1)
VIT A SERPL-MCNC: 0.69 MG/L (ref 0.3–1.2)

## 2024-04-18 LAB — VIT B1 PYROPHOSHATE BLD-SCNC: 71 NMOL/L (ref 70–180)

## 2024-04-19 ENCOUNTER — LAB (OUTPATIENT)
Dept: LAB | Facility: LAB | Age: 29
End: 2024-04-19
Payer: COMMERCIAL

## 2024-04-19 DIAGNOSIS — K21.9 GASTRO-ESOPHAGEAL REFLUX DISEASE WITHOUT ESOPHAGITIS: Primary | ICD-10-CM

## 2024-04-19 PROCEDURE — 87449 NOS EACH ORGANISM AG IA: CPT

## 2024-04-21 PROBLEM — Z82.49 FAMILY HISTORY OF DVT: Status: ACTIVE | Noted: 2024-04-21

## 2024-04-21 PROBLEM — R40.0 DAYTIME SOMNOLENCE: Status: ACTIVE | Noted: 2024-04-21

## 2024-04-21 PROBLEM — I10 PRIMARY HYPERTENSION: Status: ACTIVE | Noted: 2024-04-21

## 2024-04-21 PROBLEM — E66.01 MORBID OBESITY WITH BMI OF 40.0-44.9, ADULT (MULTI): Status: ACTIVE | Noted: 2024-04-21

## 2024-04-22 DIAGNOSIS — I10 HYPERTENSION, UNSPECIFIED TYPE: ICD-10-CM

## 2024-04-22 DIAGNOSIS — G47.00 INSOMNIA, UNSPECIFIED TYPE: ICD-10-CM

## 2024-04-22 DIAGNOSIS — G47.19 EXCESSIVE DAYTIME SLEEPINESS: ICD-10-CM

## 2024-04-22 DIAGNOSIS — R06.83 SNORING: ICD-10-CM

## 2024-04-22 DIAGNOSIS — G47.33 OBSTRUCTIVE SLEEP APNEA SYNDROME: ICD-10-CM

## 2024-04-23 LAB — H PYLORI AG STL QL IA: NEGATIVE

## 2024-05-07 NOTE — PROGRESS NOTES
Medical Weight Loss Appointment ([facility])    Current Weight:   Current BMI:     Current Diet:  Adherence:  Daily Intake:   Breakfast-  Lunch-  Dinner-  Snacks:  Beverages:  Exercise:  Behavior/Diet Changes:    Estimated ability to achieve goals:    Today's Lesson: Reviewed patient's dietary changes and food recall  Diet Goals: Practice the lifelong rules  Exercise Recommendations: Gradually work up to 150 minutes of moderate intensity exercise per week.  Behavior Goals:

## 2024-05-08 ENCOUNTER — HOSPITAL ENCOUNTER (OUTPATIENT)
Dept: RADIOLOGY | Facility: HOSPITAL | Age: 29
End: 2024-05-08
Payer: COMMERCIAL

## 2024-05-09 ENCOUNTER — APPOINTMENT (OUTPATIENT)
Dept: SURGERY | Facility: CLINIC | Age: 29
End: 2024-05-09
Payer: COMMERCIAL

## 2024-05-10 ENCOUNTER — APPOINTMENT (OUTPATIENT)
Dept: RADIOLOGY | Facility: HOSPITAL | Age: 29
End: 2024-05-10
Payer: COMMERCIAL

## 2024-05-15 ENCOUNTER — NUTRITION (OUTPATIENT)
Dept: SURGERY | Facility: CLINIC | Age: 29
End: 2024-05-15
Payer: COMMERCIAL

## 2024-05-15 ENCOUNTER — OFFICE VISIT (OUTPATIENT)
Dept: SURGERY | Facility: CLINIC | Age: 29
End: 2024-05-15
Payer: COMMERCIAL

## 2024-05-15 VITALS
HEART RATE: 72 BPM | SYSTOLIC BLOOD PRESSURE: 140 MMHG | BODY MASS INDEX: 38.92 KG/M2 | HEIGHT: 71 IN | DIASTOLIC BLOOD PRESSURE: 95 MMHG | WEIGHT: 278 LBS

## 2024-05-15 DIAGNOSIS — G47.33 OBSTRUCTIVE SLEEP APNEA: ICD-10-CM

## 2024-05-15 DIAGNOSIS — K21.9 GASTROESOPHAGEAL REFLUX DISEASE WITHOUT ESOPHAGITIS: Primary | ICD-10-CM

## 2024-05-15 DIAGNOSIS — E66.01 MORBID OBESITY WITH BMI OF 40.0-44.9, ADULT (MULTI): ICD-10-CM

## 2024-05-15 PROCEDURE — 3080F DIAST BP >= 90 MM HG: CPT | Performed by: INTERNAL MEDICINE

## 2024-05-15 PROCEDURE — 99213 OFFICE O/P EST LOW 20 MIN: CPT | Performed by: INTERNAL MEDICINE

## 2024-05-15 PROCEDURE — 3077F SYST BP >= 140 MM HG: CPT | Performed by: INTERNAL MEDICINE

## 2024-05-15 PROCEDURE — 3008F BODY MASS INDEX DOCD: CPT | Performed by: INTERNAL MEDICINE

## 2024-05-15 ASSESSMENT — ENCOUNTER SYMPTOMS
COUGH: 0
ARTHRALGIAS: 0
DIZZINESS: 0
ABDOMINAL PAIN: 0
NAUSEA: 0
PALPITATIONS: 0
DIARRHEA: 0
SHORTNESS OF BREATH: 0
CONSTIPATION: 0
ROS GI COMMENTS: HEARTBURN

## 2024-05-15 ASSESSMENT — LIFESTYLE VARIABLES
HOW OFTEN DO YOU HAVE A DRINK CONTAINING ALCOHOL: NEVER
HOW OFTEN DO YOU HAVE SIX OR MORE DRINKS ON ONE OCCASION: NEVER
HOW MANY STANDARD DRINKS CONTAINING ALCOHOL DO YOU HAVE ON A TYPICAL DAY: PATIENT DOES NOT DRINK
SKIP TO QUESTIONS 9-10: 1
AUDIT-C TOTAL SCORE: 0

## 2024-05-15 ASSESSMENT — COLUMBIA-SUICIDE SEVERITY RATING SCALE - C-SSRS
1. IN THE PAST MONTH, HAVE YOU WISHED YOU WERE DEAD OR WISHED YOU COULD GO TO SLEEP AND NOT WAKE UP?: NO
6. HAVE YOU EVER DONE ANYTHING, STARTED TO DO ANYTHING, OR PREPARED TO DO ANYTHING TO END YOUR LIFE?: NO
2. HAVE YOU ACTUALLY HAD ANY THOUGHTS OF KILLING YOURSELF?: NO

## 2024-05-15 ASSESSMENT — PAIN SCALES - GENERAL: PAINLEVEL: 0-NO PAIN

## 2024-05-15 NOTE — PROGRESS NOTES
Medical Weight Loss Appointment ([facility] )    Starting Weight: 284 lbs   Current Weight: 278 lbs / 2 lb weight loss from last visit   Current BMI:  39.61     Current Diet: practicing the lifelong rules   Adherence: good   Daily Intake: 3 meals meals per day   Breakfast (7:30am) - omelet with mushrooms or peppers    Lunch (12:30-1:30pm)- Gatorade protein bar or a pack of tuna   PM snack - Fairlife protein shake   Dinner (7-7:30pm) - grilled chicken or turkey burger on a thin sliced bun, or turkey hotdog. With veggies such as green beans, broccoli, corn, sweet potatoes.   Snacks: listed above   Beverages: gatorade zero, zero vitamin water, smart verdin   Exercise: Walking around the block at work and is physically active at work.   Behavior/Diet Changes: Jam reports that he is referring to the Nutrition guide for meals ideas. He is having 3 meals per day with protein at each meal.     Estimated ability to achieve goals: good    Today's Lesson: Reviewed patient's dietary changes and food recall  Diet Goals: Practice the lifelong rules  Exercise Recommendations: Gradually work up to 150 minutes of moderate intensity exercise per week.  Goals for the month:   - continue to practice the lifelong rules     Plan: Will follow up next month. Will continue to monitor pt's weight, dietary & behavior changes.      Adina Ang, MS, RD, LD

## 2024-05-15 NOTE — PROGRESS NOTES
"Subjective   Patient ID: Jam Ann is a 29 y.o. male who presents for [facility] visit 4. Currently has 3 meals a day, at least 20g protein with each meal. Does not snack, occ granola bars when he is hungry. Drinks mostly water or gatorade zero. Regarding exercise, he is active at work and walks for 30-60 minutes a day. Patient c/o heartburn. His sleep study is scheduled for tomorrow. Denies personal hx of blood clots. His father has hx of DVT.    Diagnostics Reviewed: 7/2023 EKG NSR  Labs Reviewed: 4/2024 labs WNL except vit D low, triglycerides high.         Review of Systems   Respiratory:  Negative for cough and shortness of breath.    Cardiovascular:  Negative for chest pain and palpitations.   Gastrointestinal:  Negative for abdominal pain, constipation, diarrhea and nausea.        Heartburn   Musculoskeletal:  Negative for arthralgias.   Neurological:  Negative for dizziness.       Objective   BP (!) 140/95 (BP Location: Left arm, Patient Position: Sitting)   Pulse 72   Ht 1.791 m (5' 10.5\")   Wt 126 kg (278 lb)   BMI 39.33 kg/m²     Physical Exam  Constitutional:       General: He is not in acute distress.     Appearance: He is obese.   HENT:      Mouth/Throat:      Comments: Dentition WNL  Cardiovascular:      Rate and Rhythm: Normal rate and regular rhythm.      Heart sounds: Normal heart sounds.   Pulmonary:      Breath sounds: Normal breath sounds.   Abdominal:      Palpations: Abdomen is soft.      Tenderness: There is no abdominal tenderness.   Musculoskeletal:      Cervical back: No tenderness.      Right lower leg: No edema.      Left lower leg: No edema.   Lymphadenopathy:      Cervical: No cervical adenopathy.   Skin:     General: Skin is warm.   Neurological:      Mental Status: He is alert.      Gait: Gait is intact.         Assessment/Plan   Diagnoses and all orders for this visit:  Gastroesophageal reflux disease without esophagitis  Morbid obesity with BMI of 40.0-44.9, adult " (Multi)  Obstructive sleep apnea          Scribe Attestation  By signing my name below, I, Andree Disla, Scribe   attest that this documentation has been prepared under the direction and in the presence of Becky Tabor MD.

## 2024-05-16 ENCOUNTER — APPOINTMENT (OUTPATIENT)
Dept: SLEEP MEDICINE | Facility: CLINIC | Age: 29
End: 2024-05-16
Payer: COMMERCIAL

## 2024-05-24 ENCOUNTER — APPOINTMENT (OUTPATIENT)
Dept: PREADMISSION TESTING | Facility: HOSPITAL | Age: 29
End: 2024-05-24
Payer: COMMERCIAL

## 2024-05-29 ENCOUNTER — ANESTHESIA EVENT (OUTPATIENT)
Dept: GASTROENTEROLOGY | Facility: HOSPITAL | Age: 29
End: 2024-05-29

## 2024-05-30 ENCOUNTER — ANESTHESIA (OUTPATIENT)
Dept: GASTROENTEROLOGY | Facility: HOSPITAL | Age: 29
End: 2024-05-30
Payer: COMMERCIAL

## 2024-06-04 ENCOUNTER — HOSPITAL ENCOUNTER (OUTPATIENT)
Dept: RADIOLOGY | Facility: HOSPITAL | Age: 29
End: 2024-06-04
Payer: COMMERCIAL

## 2024-06-12 ENCOUNTER — APPOINTMENT (OUTPATIENT)
Dept: SLEEP MEDICINE | Facility: CLINIC | Age: 29
End: 2024-06-12
Payer: COMMERCIAL

## 2024-06-13 ENCOUNTER — TELEPHONE (OUTPATIENT)
Dept: SURGERY | Facility: CLINIC | Age: 29
End: 2024-06-13
Payer: COMMERCIAL

## 2024-06-13 NOTE — TELEPHONE ENCOUNTER
lvm June 18th appt needs to be changed/ due to Dr. Tabor will not be available in the late afternoon please call the office asap office number provided

## 2024-06-14 ENCOUNTER — TELEPHONE (OUTPATIENT)
Dept: SURGERY | Facility: CLINIC | Age: 29
End: 2024-06-14
Payer: COMMERCIAL

## 2024-06-14 NOTE — TELEPHONE ENCOUNTER
LVM REGARDING APPT ON JUNE 18TH, WITH DR. ZAVALA NEEDS TO BE MOVED UP DUE TO DR. ZAVALA WILL NOT BE HERE IN THE LATE AFTERNOON REQUEST PATIENT TO CALL BACK TO R/S. THERE WILL BE NO CHANGE TO THE DIETITIAN APPT.

## 2024-06-17 ENCOUNTER — TELEPHONE (OUTPATIENT)
Dept: SURGERY | Facility: CLINIC | Age: 29
End: 2024-06-17
Payer: COMMERCIAL

## 2024-06-17 NOTE — TELEPHONE ENCOUNTER
several attempts to reach the patient by phone unsuccessful/ patient instructed that the appt with Dr. Tabor on June 18th will be moved up to a televisit at 1200 noon/ please keep the in office visit with the dietititian as scheduled. request for the pt to call back

## 2024-06-18 ENCOUNTER — APPOINTMENT (OUTPATIENT)
Dept: SURGERY | Facility: CLINIC | Age: 29
End: 2024-06-18
Payer: COMMERCIAL

## 2024-06-27 ENCOUNTER — APPOINTMENT (OUTPATIENT)
Dept: SURGERY | Facility: CLINIC | Age: 29
End: 2024-06-27
Payer: COMMERCIAL

## 2024-06-27 ENCOUNTER — TELEPHONE (OUTPATIENT)
Dept: SURGERY | Facility: CLINIC | Age: 29
End: 2024-06-27

## 2024-07-10 ENCOUNTER — APPOINTMENT (OUTPATIENT)
Dept: SURGERY | Facility: CLINIC | Age: 29
End: 2024-07-10
Payer: COMMERCIAL

## 2024-12-26 ENCOUNTER — HOSPITAL ENCOUNTER (EMERGENCY)
Facility: HOSPITAL | Age: 29
Discharge: HOME | End: 2024-12-26
Payer: COMMERCIAL

## 2024-12-26 VITALS
DIASTOLIC BLOOD PRESSURE: 85 MMHG | OXYGEN SATURATION: 98 % | WEIGHT: 260 LBS | BODY MASS INDEX: 37.22 KG/M2 | TEMPERATURE: 97.8 F | HEIGHT: 70 IN | SYSTOLIC BLOOD PRESSURE: 134 MMHG | HEART RATE: 70 BPM | RESPIRATION RATE: 16 BRPM

## 2024-12-26 DIAGNOSIS — S51.812A LACERATION OF LEFT FOREARM, INITIAL ENCOUNTER: Primary | ICD-10-CM

## 2024-12-26 PROCEDURE — 12002 RPR S/N/AX/GEN/TRNK2.6-7.5CM: CPT | Performed by: PHYSICIAN ASSISTANT

## 2024-12-26 PROCEDURE — 99283 EMERGENCY DEPT VISIT LOW MDM: CPT

## 2024-12-26 RX ORDER — LIDOCAINE HYDROCHLORIDE 20 MG/ML
INJECTION, SOLUTION INFILTRATION; PERINEURAL
Status: DISCONTINUED
Start: 2024-12-26 | End: 2024-12-26 | Stop reason: HOSPADM

## 2024-12-26 RX ORDER — CEPHALEXIN 500 MG/1
500 CAPSULE ORAL 2 TIMES DAILY
Qty: 20 CAPSULE | Refills: 0 | Status: SHIPPED | OUTPATIENT
Start: 2024-12-26 | End: 2025-01-05

## 2024-12-26 RX ORDER — CEPHALEXIN 500 MG/1
500 CAPSULE ORAL 2 TIMES DAILY
Qty: 20 CAPSULE | Refills: 0 | Status: SHIPPED | OUTPATIENT
Start: 2024-12-26 | End: 2024-12-26 | Stop reason: WASHOUT

## 2024-12-26 RX ORDER — CEPHALEXIN 500 MG/1
500 CAPSULE ORAL 2 TIMES DAILY
Qty: 20 CAPSULE | Refills: 0 | Status: SHIPPED | OUTPATIENT
Start: 2024-12-26 | End: 2024-12-26

## 2024-12-26 ASSESSMENT — COLUMBIA-SUICIDE SEVERITY RATING SCALE - C-SSRS
2. HAVE YOU ACTUALLY HAD ANY THOUGHTS OF KILLING YOURSELF?: NO
1. IN THE PAST MONTH, HAVE YOU WISHED YOU WERE DEAD OR WISHED YOU COULD GO TO SLEEP AND NOT WAKE UP?: NO
6. HAVE YOU EVER DONE ANYTHING, STARTED TO DO ANYTHING, OR PREPARED TO DO ANYTHING TO END YOUR LIFE?: NO

## 2024-12-26 ASSESSMENT — PAIN SCALES - GENERAL: PAINLEVEL_OUTOF10: 0 - NO PAIN

## 2024-12-26 ASSESSMENT — PAIN DESCRIPTION - PAIN TYPE: TYPE: ACUTE PAIN

## 2024-12-26 ASSESSMENT — PAIN - FUNCTIONAL ASSESSMENT: PAIN_FUNCTIONAL_ASSESSMENT: 0-10

## 2024-12-26 NOTE — ED TRIAGE NOTES
States he cut his left forearm working on his car this evening . States he has a laceration to the left forearm. He has it bandaged in triage, bleeding controlled.

## 2024-12-27 NOTE — ED PROVIDER NOTES
HPI   Chief Complaint   Patient presents with    Laceration     States he cut his left forearm working on his car this evening . States he has a laceration to the left forearm. He has it bandaged in triage, bleeding controlled.        29-year-old male here with laceration to the left forearm he cut it on a piece of metal from his car door last tetanus was within the last 2 years per patient    Incident happened just prior to coming to the ED      History provided by:  Patient          Patient History   Past Medical History:   Diagnosis Date    GERD (gastroesophageal reflux disease)      No past surgical history on file.  Family History   Problem Relation Name Age of Onset    Asthma Mother      Other (htn) Mother      Diabetes Father      Heart disease Father      Stroke Father      Heart attack Father      Blood clot Father       Social History     Tobacco Use    Smoking status: Former     Types: Cigarettes     Passive exposure: Never    Smokeless tobacco: Former   Vaping Use    Vaping status: Former    Substances: Nicotine   Substance Use Topics    Alcohol use: Yes     Comment: every 2 weeks    Drug use: Never       Physical Exam   ED Triage Vitals [12/26/24 1803]   Temperature Heart Rate Respirations BP   36.4 °C (97.5 °F) 77 16 (!) 154/98      Pulse Ox Temp Source Heart Rate Source Patient Position   98 % Temporal Monitor Sitting      BP Location FiO2 (%)     Right arm --       Physical Exam  Vitals and nursing note reviewed.   Constitutional:       General: He is not in acute distress.     Appearance: Normal appearance. He is well-developed.   HENT:      Head: Normocephalic and atraumatic.      Right Ear: External ear normal.      Left Ear: External ear normal.      Nose: Nose normal.      Mouth/Throat:      Pharynx: Oropharynx is clear.   Eyes:      Conjunctiva/sclera: Conjunctivae normal.   Cardiovascular:      Rate and Rhythm: Normal rate and regular rhythm.      Heart sounds: No murmur heard.  Pulmonary:       Effort: Pulmonary effort is normal. No respiratory distress.      Breath sounds: Normal breath sounds.   Abdominal:      Palpations: Abdomen is soft.      Tenderness: There is no abdominal tenderness.   Musculoskeletal:         General: No swelling.      Cervical back: Neck supple.      Comments: 4 centimeter linear laceration across the left mid forearm    No bony pain or injury   Skin:     General: Skin is warm and dry.      Capillary Refill: Capillary refill takes less than 2 seconds.   Neurological:      Mental Status: He is alert.   Psychiatric:         Mood and Affect: Mood normal.           ED Course & MDM   Diagnoses as of 12/26/24 2107   Laceration of left forearm, initial encounter                 No data recorded                                 Medical Decision Making  Differential:   1) laceration left forearm     Plan: Discussed differential with the patient and /or parents family   Patient to  follow up with the PCP in the next 2-3 days  Return for any worsening symptoms or go to the ER for further evaluation. Patient/family/caregiver  understands return   precautions and discharge instructions and is agreeable to the current plan   Impression: Laceration left forearm        Orders and Diagnoses for this visit        Procedure  Laceration Repair    Performed by: Darby Miles PA-C  Authorized by: Darby Miles PA-C    Consent:     Consent obtained:  Verbal    Consent given by:  Patient    Risks, benefits, and alternatives were discussed: yes      Risks discussed:  Infection, need for additional repair and nerve damage    Alternatives discussed:  No treatment  Universal protocol:     Patient identity confirmed:  Verbally with patient  Anesthesia:     Anesthesia method:  Local infiltration    Local anesthetic:  Lidocaine 1% WITH epi  Laceration details:     Location:  Shoulder/arm    Shoulder/arm location:  L lower arm    Length (cm):  4    Depth (mm):  3  Pre-procedure details:     Preparation:   Patient was prepped and draped in usual sterile fashion  Exploration:     Limited defect created (wound extended): no      Wound exploration: wound explored through full range of motion      Contaminated: yes    Treatment:     Area cleansed with:  Chlorhexidine    Amount of cleaning:  Extensive    Irrigation solution:  Sterile saline    Irrigation volume:  40    Irrigation method:  Syringe    Visualized foreign bodies/material removed: no      Debridement:  Minimal    Undermining:  None  Skin repair:     Repair method:  Sutures    Suture size:  4-0    Suture material:  Nylon    Suture technique:  Simple interrupted    Number of sutures:  3  Approximation:     Approximation:  Close  Repair type:     Repair type:  Simple  Post-procedure details:     Dressing:  Antibiotic ointment and adhesive bandage    Procedure completion:  Tolerated well, no immediate complications       Darby Miles PA-C  12/26/24 [id number]